# Patient Record
Sex: FEMALE | Race: WHITE | NOT HISPANIC OR LATINO | URBAN - METROPOLITAN AREA
[De-identification: names, ages, dates, MRNs, and addresses within clinical notes are randomized per-mention and may not be internally consistent; named-entity substitution may affect disease eponyms.]

---

## 2025-08-06 ENCOUNTER — OFFICE VISIT (OUTPATIENT)
Dept: URGENT CARE | Age: 55
End: 2025-08-06
Payer: COMMERCIAL

## 2025-08-06 VITALS
HEIGHT: 66 IN | RESPIRATION RATE: 16 BRPM | SYSTOLIC BLOOD PRESSURE: 112 MMHG | WEIGHT: 165 LBS | OXYGEN SATURATION: 97 % | DIASTOLIC BLOOD PRESSURE: 62 MMHG | HEART RATE: 97 BPM | TEMPERATURE: 98.2 F | BODY MASS INDEX: 26.52 KG/M2

## 2025-08-06 DIAGNOSIS — N30.01 ACUTE CYSTITIS WITH HEMATURIA: Primary | ICD-10-CM

## 2025-08-06 LAB
POC APPEARANCE, URINE: ABNORMAL
POC BILIRUBIN, URINE: NEGATIVE
POC BLOOD, URINE: ABNORMAL
POC COLOR, URINE: YELLOW
POC GLUCOSE, URINE: NEGATIVE MG/DL
POC KETONES, URINE: NEGATIVE MG/DL
POC LEUKOCYTES, URINE: ABNORMAL
POC NITRITE,URINE: NEGATIVE
POC PH, URINE: 6 PH
POC PROTEIN, URINE: NEGATIVE MG/DL
POC SPECIFIC GRAVITY, URINE: <=1.005
POC UROBILINOGEN, URINE: 0.2 EU/DL
PREGNANCY TEST URINE, POC: NEGATIVE

## 2025-08-06 PROCEDURE — 99204 OFFICE O/P NEW MOD 45 MIN: CPT | Performed by: PHYSICIAN ASSISTANT

## 2025-08-06 PROCEDURE — 99070 SPECIAL SUPPLIES PHYS/QHP: CPT | Performed by: PHYSICIAN ASSISTANT

## 2025-08-06 PROCEDURE — 3008F BODY MASS INDEX DOCD: CPT | Performed by: PHYSICIAN ASSISTANT

## 2025-08-06 PROCEDURE — 81003 URINALYSIS AUTO W/O SCOPE: CPT | Performed by: PHYSICIAN ASSISTANT

## 2025-08-06 PROCEDURE — 81025 URINE PREGNANCY TEST: CPT | Performed by: PHYSICIAN ASSISTANT

## 2025-08-06 RX ORDER — CEFADROXIL 500 MG/1
500 CAPSULE ORAL 2 TIMES DAILY
Qty: 20 CAPSULE | Refills: 0 | Status: SHIPPED | OUTPATIENT
Start: 2025-08-06 | End: 2025-08-13

## 2025-08-06 RX ORDER — LOSARTAN POTASSIUM 25 MG/1
25 TABLET ORAL DAILY
COMMUNITY

## 2025-08-06 ASSESSMENT — ENCOUNTER SYMPTOMS
DYSURIA: 1
ARTHRALGIAS: 0
ABDOMINAL PAIN: 0
CONFUSION: 0
DIARRHEA: 0
JOINT SWELLING: 0
FATIGUE: 0
CHEST TIGHTNESS: 0
AGITATION: 0
SHORTNESS OF BREATH: 0
FEVER: 0
COUGH: 0
HEMATURIA: 0
FREQUENCY: 1
CHILLS: 0
VOMITING: 0
FLANK PAIN: 0
NAUSEA: 0

## 2025-08-06 ASSESSMENT — PATIENT HEALTH QUESTIONNAIRE - PHQ9
1. LITTLE INTEREST OR PLEASURE IN DOING THINGS: NOT AT ALL
SUM OF ALL RESPONSES TO PHQ9 QUESTIONS 1 AND 2: 0
2. FEELING DOWN, DEPRESSED OR HOPELESS: NOT AT ALL

## 2025-08-06 NOTE — PROGRESS NOTES
Subjective   Patient ID: Macy Limon is a 54 y.o. female. They present today with a chief complaint of Difficulty Urinating (X one day.  Pt did telemed thru [facility] on 7/16/25 for uti. Given macrobid. Pt not sure if uti totally went away. No culture done at time. No std concern).    History of Present Illness  Pt reports had UTI wks ago and treated with Macrobid w/o further test completed. Feels symptoms improved but noticed pain again 2-3 days ago. Continues having urgency and frequency. Denies fever, chills, N/V, abdominal or flank pain.       Difficulty Urinating  Associated symptoms: no abdominal pain, no fever, no flank pain, no nausea, no vaginal discharge and no vomiting        Past Medical History  Allergies as of 08/06/2025 - Reviewed 08/06/2025   Allergen Reaction Noted    Sulfa (sulfonamide antibiotics) Hives 08/06/2025       Prescriptions Prior to Admission[1]     Medical History[2]    Surgical History[3]     reports that she has quit smoking. Her smoking use included cigarettes. She has never used smokeless tobacco.    Review of Systems  Review of Systems   Constitutional:  Negative for chills, fatigue and fever.   Respiratory:  Negative for cough, chest tightness and shortness of breath.    Cardiovascular:  Negative for chest pain.   Gastrointestinal:  Negative for abdominal pain, diarrhea, nausea and vomiting.   Genitourinary:  Positive for dysuria, frequency and urgency. Negative for decreased urine volume, flank pain, hematuria, pelvic pain, vaginal bleeding, vaginal discharge and vaginal pain.   Musculoskeletal:  Negative for arthralgias and joint swelling.   Psychiatric/Behavioral:  Negative for agitation and confusion.                                   Objective    Vitals:    08/06/25 1210   BP: 112/62   BP Location: Right arm   Patient Position: Sitting   BP Cuff Size: Adult   Pulse: 97   Resp: 16   Temp: 36.8 °C (98.2 °F)   TempSrc: Oral   SpO2: 97%   Weight: 74.8 kg (165 lb)   Height: 1.676 m  "(5' 6\")     Patient's last menstrual period was 07/03/2025 (exact date).    Physical Exam  Constitutional:       Appearance: Normal appearance.   HENT:      Head: Normocephalic and atraumatic.      Nose: Nose normal.     Cardiovascular:      Rate and Rhythm: Normal rate and regular rhythm.      Heart sounds: No murmur heard.  Pulmonary:      Effort: Pulmonary effort is normal.      Breath sounds: Normal breath sounds.   Abdominal:      General: Abdomen is flat. There is no distension.      Palpations: Abdomen is soft.      Tenderness: There is no abdominal tenderness. There is no right CVA tenderness, left CVA tenderness or guarding.     Musculoskeletal:         General: Normal range of motion.     Neurological:      Mental Status: She is alert and oriented to person, place, and time.     Psychiatric:         Mood and Affect: Mood normal.         Procedures    Point of Care Test & Imaging Results from this visit  Results for orders placed or performed in visit on 08/06/25   POCT pregnancy, urine manually resulted   Result Value Ref Range    Preg Test, Ur Negative Negative   POCT UA Automated manually resulted   Result Value Ref Range    POC Color, Urine Yellow Straw, Yellow, Light-Yellow    POC Appearance, Urine Hazy (A) Clear    POC Glucose, Urine NEGATIVE NEGATIVE mg/dl    POC Bilirubin, Urine NEGATIVE NEGATIVE    POC Ketones, Urine NEGATIVE NEGATIVE mg/dl    POC Specific Gravity, Urine <=1.005 1.005 - 1.035    POC Blood, Urine LARGE (3+) (A) NEGATIVE    POC PH, Urine 6.0 No Reference Range Established PH    POC Protein, Urine NEGATIVE NEGATIVE mg/dl    POC Urobilinogen, Urine 0.2 0.2, 1.0 EU/DL    Poc Nitrite, Urine NEGATIVE NEGATIVE    POC Leukocytes, Urine LARGE (3+) (A) NEGATIVE      Imaging  No results found.    Cardiology, Vascular, and Other Imaging  No other imaging results found for the past 2 days      Diagnostic study results (if any) were reviewed by Char Blue PA-C.    Assessment/Plan   Allergies, " medications, history, and pertinent labs/EKGs/Imaging reviewed by Char Blue PA-C.     Medical Decision Making  Urine dip shows signs of UTI  Symptomatic and will start treatment  Low suspicion for kidney infection, culture sent to better assess condition    Orders and Diagnoses  Diagnoses and all orders for this visit:  Acute cystitis with hematuria  -     POCT pregnancy, urine manually resulted  -     POCT UA Automated manually resulted  -     cefadroxil (Duricef) 500 mg capsule; Take 1 capsule (500 mg) by mouth 2 times a day for 7 days.  -     Urine Culture      Medical Admin Record      Patient disposition: Home    Electronically signed by Char Blue PA-C  12:35 PM           [1] (Not in a hospital admission)   [2]   Past Medical History:  Diagnosis Date    Personal history of other diseases of the respiratory system 03/14/2014    History of sinusitis   [3]   Past Surgical History:  Procedure Laterality Date    CHOLECYSTECTOMY  04/23/2013    Cholecystectomy    COLONOSCOPY  11/06/2013    Complete Colonoscopy    LITHOTRIPSY  04/23/2013    Renal Lithotripsy

## 2025-08-06 NOTE — PATIENT INSTRUCTIONS
Hydration, take medication as instructed  Will call if need to change tx pending on final culture result  F/U with PCP for lingering symptoms

## 2025-08-08 LAB — BACTERIA UR CULT: NORMAL
